# Patient Record
Sex: FEMALE | Race: WHITE | Employment: FULL TIME | URBAN - METROPOLITAN AREA
[De-identification: names, ages, dates, MRNs, and addresses within clinical notes are randomized per-mention and may not be internally consistent; named-entity substitution may affect disease eponyms.]

---

## 2017-01-30 PROBLEM — F41.0 PANIC DISORDER: Status: ACTIVE | Noted: 2017-01-30

## 2017-02-09 ENCOUNTER — HOSPITAL ENCOUNTER (OUTPATIENT)
Dept: PHYSICAL THERAPY | Age: 27
Discharge: HOME | End: 2017-02-09
Attending: INTERNAL MEDICINE
Payer: COMMERCIAL

## 2017-02-09 DIAGNOSIS — M25.562 CHRONIC PAIN OF LEFT KNEE: ICD-10-CM

## 2017-02-09 DIAGNOSIS — G89.29 CHRONIC PAIN OF LEFT KNEE: ICD-10-CM

## 2017-02-09 PROCEDURE — 97162 PT EVAL MOD COMPLEX 30 MIN: CPT

## 2017-02-09 PROCEDURE — 97161 PT EVAL LOW COMPLEX 20 MIN: CPT

## 2017-02-09 PROCEDURE — 97110 THERAPEUTIC EXERCISES: CPT

## 2017-02-09 NOTE — PROGRESS NOTES
Ambulatory/Rehab Services H2 Model Falls Risk Assessment    Risk Factor Pts. ·   Confusion/Disorientation/Impulsivity  []    4 ·   Symptomatic Depression  []   2 ·   Altered Elimination  []   1 ·   Dizziness/Vertigo  []   1 ·   Gender (Male)  []   1 ·   Any administered antiepileptics (anticonvulsants):  []   2 ·   Any administered benzodiazepines:  []   1 ·   Visual Impairment (specify):  []   1 ·   Portable Oxygen Use  []   1 ·   Orthostatic ? BP  []   1 ·   History of Recent Falls (within 3 mos.)  []   5     Ability to Rise from Chair (choose one) Pts. ·   Ability to rise in a single movement  [x]   0 ·   Pushes up, successful in one attempt  []   1 ·   Multiple attempts, but successful  []   3 ·   Unable to rise without assistance  []   4   Total: (5 or greater = High Risk) 0     Falls Prevention Plan:   []                Physical Limitations to Exercise (specify):   []                Mobility Assistance Device (type):   []                Exercise/Equipment Adaptation (specify):    ©2010 [facility] of [address] Patent #[id number].  Federal Law prohibits the replication, distribution or use without written permission from [facility] [employer]

## 2017-02-09 NOTE — PROGRESS NOTES
Sabas Luba  : 1990 [facility] [place] [address], [facility] [address]  [phone]   [phone]           OUTPATIENT PHYSICAL THERAPY:Initial Assessment 2017    ICD-10: Treatment Diagnosis: Left Knee pain M25.562  Precautions/Allergies:   Geno Bunkers [place] nut]   Fall Risk Score: 0 (? 5 = High Risk)  MD Orders: eval and treat MEDICAL/REFERRING DIAGNOSIS:  Chronic pain of left knee [M25.562, G89.29]   DATE OF ONSET: Original onset  with flare up Early   REFERRING PHYSICIAN: Fritz Dennison MD  RETURN PHYSICIAN APPOINTMENT: March or May 2017     INITIAL ASSESSMENT:  Ms. Payton Rodriguez presents restricted and tender hamstrings on L LE, decreased L LE strength especially hip extension and abduction, decreased end-range knee flexion and poor weight bearing during squat that limits ability to perform recreational activities. Pt would benefit from skilled therapy to address these deficits for return to previous level of function. PROBLEM LIST (Impacting functional limitations):  1. Decreased Strength  2. Decreased ADL/Functional Activities  3. Decreased Transfer Abilities  4. Decreased Ambulation Ability/Technique  5. Decreased Balance  6. Increased Pain  7. Decreased Flexibility/Joint Mobility  8. Decreased [place] with Home Exercise Program INTERVENTIONS PLANNED:  1. Cold  2. Gait Training  3. Home Exercise Program (HEP)  4. Manual Therapy  5. Range of Motion (ROM)  6. Therapeutic Exercise/Strengthening   TREATMENT PLAN:  Effective Dates: 17 TO 17. Frequency/Duration: 2 times a week for 6 weeks  GOALS: (Goals have been discussed and agreed upon with patient.)  Short-Term Functional Goals: Time Frame: 4 weeks  1. Pt will be I with HEP to allow for continued progress with symptom management. 2. Pt will improve squat to 1/2 depth and equal weight bearing. Discharge Goals: Time Frame: 6 weeks  1.  Pt will improve LEFS score to >62 to reflect an improvement in function. 2. Pt will improve c/o pain to 2/10 to allow for improved tolerance for running. 3. Pt will demonstrate 5/5 strength in all planes to improve tolerance for recreational activities. 4. Pt will demonstrate full depth squat with minimal excursions and c/o pain. Rehabilitation Potential For Stated Goals: Good  Regarding Deep Fam therapy, I certify that the treatment plan above will be carried out by a therapist or under their direction. Thank you for this referral,  Lakeshia Cuellar PT     Referring Physician Signature: Avi Yo MD              Date                    The information in this section was collected on 02-09-17 (except where otherwise noted). HISTORY:   History of Present Injury/Illness (Reason for Referral):  Pt is a 27 y.o. Female presenting to PT with chronic LEFT KNEE pain. Injured knee originally in high school playing soccer. Recently pain has gotten worse with no obvious reason. Has been having night pain that will keep patient up for a couple hours per night. Has to take Tylenol PM to sleep every night. Has enjoyed running and rock climbing but is unable to do so b/c of knee pain. 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 Some c/o pain with stairs but is inconsistent. Describes pain as deep sharp and sometimes aching pain. Is unable to find comfortable position at night after pain onset but improves with tylenol PM.   Past Medical History/Comorbidities:   Ms. Mi Flynn  has a past medical history of Anxiety and Asthma. Ms. Mi Flynn  has a past surgical history that includes wisdom teeth extraction.   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 , Disp: 30 Tab, Rfl: 4   Date Last Reviewed:  2/9/2017     Number of Personal Factors/Comorbidities that affect the Plan of Care: 0: LOW COMPLEXITY   EXAMINATION:   Observation/Orthostatic Postural Assessment:          No joint effusion   Palpation:          Tenderness and restriction in hamstrings tendon  ROM:            Left LE Right LE   Knee flexion- 150 degrees  Knee extension-  0 degrees Knee flexion- 145 degrees  Knee extension-  4 degrees         Strength:            Left LE Right LE   Knee flexion- 4/5   Knee extension-  4+/5   Hip Ext- 4-/5   Hip Flexion-  4/5   Hip ER- 5-/5   Hip IR- 5-/5   Hip ABD- 4/5    Knee flexion- 5/5   Knee extension-  5/5   Hip Ext- 5-/5   Hip Flexion-  5/5   Hip ER- 5-/5   Hip IR- 5-/5   Hip ABD- 5-/5          Special Tests:          EMILEE- negative, Anterior and posterior drawer- negative, Thessaly- negative  Functional Mobility:         Gait/Ambulation:  WNL  Squat- 1/4 depth with pain and off-weight  Balance:          SLS: R- 30+ sec, L- 10 sec    Body Structures Involved:  1. Joints  2. Muscles Body Functions Affected:  1. Sensory/Pain  2. Neuromusculoskeletal  3. Movement Related Activities and Participation Affected:  1. General Tasks and Demands  2. Mobility   Number of elements (examined above) that affect the Plan of Care: 3: MODERATE COMPLEXITY   CLINICAL PRESENTATION:   Presentation: Stable and uncomplicated: LOW COMPLEXITY   CLINICAL DECISION MAKING:   Outcome Measure: Tool Used: Lower Extremity Functional Scale (LEFS)  Score:  Initial: 58/80 Most Recent: X/80 (Date: -- )   Interpretation of Score: 20 questions each scored on a 5 point scale with 0 representing \"extreme difficulty or unable to perform\" and 4 representing \"no difficulty\". The lower the score, the greater the functional disability. 80/80 represents no disability. Minimal detectable change is 9 points.   Score 80 79-63 62-48 47-32 31-16 15-1 0   Modifier CH CI CJ CK CL CM CN       Medical Necessity:   · Patient is expected to demonstrate progress in strength, range of motion and balance to return to running. Reason for Services/Other Comments:  · Patient continues to require present interventions due to patient's inability to run. Use of outcome tool(s) and clinical judgement create a POC that gives a: Clear prediction of patient's progress: LOW COMPLEXITY            TREATMENT:   (In addition to Assessment/Re-Assessment sessions the following treatments were rendered)  Pre-treatment Symptoms/Complaints:  LEFT KNEE PAIN  Pain: Initial:   Pain Intensity 1:  (0-8/10)  Post Session:  5/10     THERAPEUTIC EXERCISE: (14 minutes):  Exercises per grid below to improve mobility and strength. Required moderate visual, verbal and tactile cues to promote proper body alignment, promote proper body posture and promote proper body mechanics. Progressed resistance, range, repetitions and complexity of movement as indicated. Date:  02-09-17 Date:   Date:     Activity/Exercise Parameters Parameters Parameters   Prone hip ext 20 reps     Side lying hip ABD 20 reps     Hamstring stretch 2x30 sec hold                                   Treatment/Session Assessment:    · Response to Treatment:  Pt would benefit from skilled therapy to address the aforementioned deficits that limit functional ability to run. · Compliance with Program/Exercises: Will assess as treatment progresses. · Recommendations/Intent for next treatment session: \"Next visit will focus on advancements to more challenging activities\".   Total Treatment Duration:  PT Patient Time In/Time Out  Time In: 1531  Time Out: Emmanuel Briceno 35, PT

## 2017-02-21 ENCOUNTER — HOSPITAL ENCOUNTER (OUTPATIENT)
Dept: PHYSICAL THERAPY | Age: 27
Discharge: HOME | End: 2017-02-21
Attending: INTERNAL MEDICINE
Payer: COMMERCIAL

## 2017-03-14 ENCOUNTER — APPOINTMENT (OUTPATIENT)
Dept: PHYSICAL THERAPY | Age: 27
End: 2017-03-14
Attending: INTERNAL MEDICINE

## 2017-03-21 ENCOUNTER — APPOINTMENT (OUTPATIENT)
Dept: PHYSICAL THERAPY | Age: 27
End: 2017-03-21
Attending: INTERNAL MEDICINE

## 2017-04-05 NOTE — PROGRESS NOTES
Amanuel Shine  : 1990 [facility]  [address], [address] [place] [address], [facility] [address]  [phone]   [phone]           OUTPATIENT PHYSICAL THERAPY:Discontinuation Summary 2017    ICD-10: Treatment Diagnosis: Left Knee pain M25.562  Precautions/Allergies:   [place] nut]   Fall Risk Score: 0 (? 5 = High Risk)  MD Orders: eval and treat MEDICAL/REFERRING DIAGNOSIS:  Chronic pain of left knee [M25.562, G89.29]   DATE OF ONSET: Original onset  with flare up Early   REFERRING PHYSICIAN: Sonido Godinez MD  RETURN PHYSICIAN APPOINTMENT: March or May 2017     INITIAL ASSESSMENT:  Ms. Jamie Duncan presents restricted and tender hamstrings on L LE, decreased L LE strength especially hip extension and abduction, decreased end-range knee flexion and poor weight bearing during squat that limits ability to perform recreational activities. Pt would benefit from skilled therapy to address these deficits for return to previous level of function. PROBLEM LIST (Impacting functional limitations):  1. Decreased Strength  2. Decreased ADL/Functional Activities  3. Decreased Transfer Abilities  4. Decreased Ambulation Ability/Technique  5. Decreased Balance  6. Increased Pain  7. Decreased Flexibility/Joint Mobility  8. Decreased [place] with Home Exercise Program INTERVENTIONS PLANNED:  1. Cold  2. Gait Training  3. Home Exercise Program (HEP)  4. Manual Therapy  5. Range of Motion (ROM)  6. Therapeutic Exercise/Strengthening   TREATMENT PLAN:  Effective Dates: 17 TO 17. Frequency/Duration: 2 times a week for 6 weeks  GOALS: (Goals have been discussed and agreed upon with patient.)  Short-Term Functional Goals: Time Frame: 4 weeks  1. Pt will be I with HEP to allow for continued progress with symptom management. 2. Pt will improve squat to 1/2 depth and equal weight bearing. Discharge Goals: Time Frame: 6 weeks  1.  Pt will improve LEFS score to >62 to reflect an improvement in function. 2. Pt will improve c/o pain to 2/10 to allow for improved tolerance for running. 3. Pt will demonstrate 5/5 strength in all planes to improve tolerance for recreational activities. 4. Pt will demonstrate full depth squat with minimal excursions and c/o pain. Rehabilitation Potential For Stated Goals: Good  Regarding Prosper Moss therapy, I certify that the treatment plan above will be carried out by a therapist or under their direction. Thank you for this referral,  Linnea Khalil, PT     Referring Physician Signature: Lydia Moe MD              Date                    The information in this section was collected on 02-09-17 (except where otherwise noted). HISTORY:   Unable to assess secondary to pt self-DC. History of Present Injury/Illness (Reason for Referral):  Pt is a 27 y.o. Female presenting to PT with chronic LEFT KNEE pain. Injured knee originally in high school playing soccer. Recently pain has gotten worse with no obvious reason. Has been having night pain that will keep patient up for a couple hours per night. Has to take Tylenol PM to sleep every night. Has enjoyed running and rock climbing but is unable to do so b/c of knee pain. 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 Some c/o pain with stairs but is inconsistent. Describes pain as deep sharp and sometimes aching pain. Is unable to find comfortable position at night after pain onset but improves with tylenol PM.   Past Medical History/Comorbidities:   Ms. Tanya Avalos  has a past medical history of Anxiety and Asthma. Ms. Tanya Avalos  has a past surgical history that includes wisdom teeth extraction.   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 , Disp: 30 Cap, Rfl: 4    SPRINTEC, 28, 0.25-35 mg-mcg tab, TAKE 1 TABLET BY MOUTH EVERY DAY, Disp: , Rfl: 4   Date Last Reviewed:  4/5/2017     Number of Personal Factors/Comorbidities that affect the Plan of Care: 0: LOW COMPLEXITY   EXAMINATION:   Unable to assess secondary to pt self-DC. Observation/Orthostatic Postural Assessment:          No joint effusion   Palpation:          Tenderness and restriction in hamstrings tendon  ROM:            Left LE Right LE   Knee flexion- 150 degrees  Knee extension-  0 degrees Knee flexion- 145 degrees  Knee extension-  4 degrees         Strength:            Left LE Right LE   Knee flexion- 4/5   Knee extension-  4+/5   Hip Ext- 4-/5   Hip Flexion-  4/5   Hip ER- 5-/5   Hip IR- 5-/5   Hip ABD- 4/5    Knee flexion- 5/5   Knee extension-  5/5   Hip Ext- 5-/5   Hip Flexion-  5/5   Hip ER- 5-/5   Hip IR- 5-/5   Hip ABD- 5-/5          Special Tests:          EMILEE- negative, Anterior and posterior drawer- negative, Thessaly- negative  Functional Mobility:         Gait/Ambulation:  WNL  Squat- 1/4 depth with pain and off-weight  Balance:          SLS: R- 30+ sec, L- 10 sec    Body Structures Involved:  1. Joints  2. Muscles Body Functions Affected:  1. Sensory/Pain  2. Neuromusculoskeletal  3. Movement Related Activities and Participation Affected:  1. General Tasks and Demands  2. Mobility   Number of elements (examined above) that affect the Plan of Care: 3: MODERATE COMPLEXITY   CLINICAL PRESENTATION:   Presentation: Stable and uncomplicated: LOW COMPLEXITY   CLINICAL DECISION MAKING:     Unable to assess secondary to pt self-DC. Outcome Measure: Tool Used: Lower Extremity Functional Scale (LEFS)  Score:  Initial: 58/80 Most Recent: X/80 (Date: -- )   Interpretation of Score: 20 questions each scored on a 5 point scale with 0 representing \"extreme difficulty or unable to perform\" and 4 representing \"no difficulty\".   The lower the score, the greater the functional disability. 80/80 represents no disability. Minimal detectable change is 9 points. Score 80 79-63 62-48 47-32 31-16 15-1 0   Modifier CH CI CJ CK CL CM CN       Medical Necessity:   · Patient is expected to demonstrate progress in strength, range of motion and balance to return to running. Reason for Services/Other Comments:  · Patient continues to require present interventions due to patient's inability to run.    Use of outcome tool(s) and clinical judgement create a POC that gives a: Clear prediction of patient's progress: LOW COMPLEXITY            TREATMENT:   (In addition to Assessment/Re-Assessment sessions the following treatments were rendered)    Lasalle Sandifer, PT

## 2017-09-01 PROBLEM — F40.9 PHOBIA: Status: ACTIVE | Noted: 2017-09-01

## 2020-01-23 PROBLEM — F40.9 PHOBIA: Status: RESOLVED | Noted: 2017-09-01 | Resolved: 2020-01-23
